# Patient Record
Sex: FEMALE | Race: BLACK OR AFRICAN AMERICAN | Employment: FULL TIME
[De-identification: names, ages, dates, MRNs, and addresses within clinical notes are randomized per-mention and may not be internally consistent; named-entity substitution may affect disease eponyms.]

---

## 2023-10-26 ENCOUNTER — NURSE TRIAGE (OUTPATIENT)
Dept: OTHER | Facility: CLINIC | Age: 18
End: 2023-10-26

## 2023-10-26 NOTE — TELEPHONE ENCOUNTER
[facility]    Received call from [facility]; Patient with [employer] Complaint requesting to establish care with [facility]. Subjective: Caller states \"difficulty breathing with heart palpitations\"     Current Symptoms:   -concern over hyperthyroid  -night sweats  -anxiety  -difficulty sleeping, falling asleep and staying asleep  -heart racing and pounding at times  -admits to suicidal thoughts, feeling like it would be better if she wouldn't  -admits to having a psychiatrist    Onset: 5 months ago; worsening    Pain Severity: denies pain    Temperature: denies fevers     What has been tried:   -prozac  -buspar    LMP:  irregular periods, occurring more frequentl    Recommended disposition: See PCP within 3 Days    Care advice provided, patient verbalizes understanding; denies any other questions or concerns; instructed to call back for any new or worsening symptoms. Patient/Caller agrees with recommended disposition; writer provided warm transfer to [facility] for appointment scheduling    Attention Provider: Thank you for allowing me to participate in the care of your patient. The patient was connected to triage in response to information provided to the [facility]. Please do not respond through this encounter as the response is not directed to a shared pool.     Reason for Disposition   History of hyperthyroidism or taking thyroid medication    Protocols used: Heart Rate and Heartbeat Questions-ADULT-

## 2024-01-18 LAB
ABO, EXTERNAL RESULT: NORMAL
C. TRACHOMATIS, EXTERNAL RESULT: NEGATIVE
HEP B, EXTERNAL RESULT: NEGATIVE
HIV, EXTERNAL RESULT: NEGATIVE
N. GONORRHOEAE, EXTERNAL RESULT: NEGATIVE
RPR, EXTERNAL RESULT: NORMAL
RUBELLA TITER, EXTERNAL RESULT: NORMAL

## 2024-08-05 LAB — GBS, EXTERNAL RESULT: POSITIVE

## 2024-08-22 ENCOUNTER — HOSPITAL ENCOUNTER (INPATIENT)
Facility: HOSPITAL | Age: 19
LOS: 2 days | Discharge: HOME | End: 2024-08-24
Attending: STUDENT IN AN ORGANIZED HEALTH CARE EDUCATION/TRAINING PROGRAM | Admitting: OBSTETRICS & GYNECOLOGY
Payer: MEDICAID

## 2024-08-22 ENCOUNTER — ANESTHESIA EVENT (OUTPATIENT)
Facility: HOSPITAL | Age: 19
End: 2024-08-22
Payer: MEDICAID

## 2024-08-22 ENCOUNTER — ANESTHESIA (OUTPATIENT)
Facility: HOSPITAL | Age: 19
End: 2024-08-22
Payer: MEDICAID

## 2024-08-22 PROBLEM — Z37.9 NORMAL LABOR: Status: ACTIVE | Noted: 2024-08-22

## 2024-08-22 LAB
ABO + RH BLD: NORMAL
BASOPHILS # BLD: 0.1 K/UL (ref 0–0.1)
BASOPHILS NFR BLD: 1 % (ref 0–1)
BLOOD GROUP ANTIBODIES SERPL: NORMAL
DIFFERENTIAL METHOD BLD: ABNORMAL
EOSINOPHIL # BLD: 0 K/UL (ref 0–0.4)
EOSINOPHIL NFR BLD: 0 % (ref 0–7)
ERYTHROCYTE [DISTWIDTH] IN BLOOD BY AUTOMATED COUNT: 13.7 % (ref 11.5–14.5)
HCT VFR BLD AUTO: 35 % (ref 35–47)
HGB BLD-MCNC: 11.5 G/DL (ref 11.5–16)
IMM GRANULOCYTES # BLD AUTO: 0.2 K/UL (ref 0–0.04)
IMM GRANULOCYTES NFR BLD AUTO: 2 % (ref 0–0.5)
LYMPHOCYTES # BLD: 2.3 K/UL (ref 0.8–3.5)
LYMPHOCYTES NFR BLD: 22 % (ref 12–49)
MCH RBC QN AUTO: 25.8 PG (ref 26–34)
MCHC RBC AUTO-ENTMCNC: 32.9 G/DL (ref 30–36.5)
MCV RBC AUTO: 78.7 FL (ref 80–99)
MONOCYTES # BLD: 1 K/UL (ref 0–1)
MONOCYTES NFR BLD: 9 % (ref 5–13)
NEUTS SEG # BLD: 7.2 K/UL (ref 1.8–8)
NEUTS SEG NFR BLD: 66 % (ref 32–75)
NRBC # BLD: 0 K/UL (ref 0–0.01)
NRBC BLD-RTO: 0 PER 100 WBC
PLATELET # BLD AUTO: 198 K/UL (ref 150–400)
PMV BLD AUTO: 12.4 FL (ref 8.9–12.9)
RBC # BLD AUTO: 4.45 M/UL (ref 3.8–5.2)
SPECIMEN EXP DATE BLD: NORMAL
WBC # BLD AUTO: 10.7 K/UL (ref 3.6–11)

## 2024-08-22 PROCEDURE — 51702 INSERT TEMP BLADDER CATH: CPT

## 2024-08-22 PROCEDURE — 86901 BLOOD TYPING SEROLOGIC RH(D): CPT

## 2024-08-22 PROCEDURE — 86900 BLOOD TYPING SEROLOGIC ABO: CPT

## 2024-08-22 PROCEDURE — 85025 COMPLETE CBC W/AUTO DIFF WBC: CPT

## 2024-08-22 PROCEDURE — 36415 COLL VENOUS BLD VENIPUNCTURE: CPT

## 2024-08-22 PROCEDURE — 2500000003 HC RX 250 WO HCPCS: Performed by: NURSE ANESTHETIST, CERTIFIED REGISTERED

## 2024-08-22 PROCEDURE — 6360000002 HC RX W HCPCS: Performed by: NURSE ANESTHETIST, CERTIFIED REGISTERED

## 2024-08-22 PROCEDURE — 1120000000 HC RM PRIVATE OB

## 2024-08-22 PROCEDURE — 86850 RBC ANTIBODY SCREEN: CPT

## 2024-08-22 PROCEDURE — 6370000000 HC RX 637 (ALT 250 FOR IP): Performed by: STUDENT IN AN ORGANIZED HEALTH CARE EDUCATION/TRAINING PROGRAM

## 2024-08-22 PROCEDURE — 7210000100 HC LABOR FEE PER 1 HR: Performed by: STUDENT IN AN ORGANIZED HEALTH CARE EDUCATION/TRAINING PROGRAM

## 2024-08-22 PROCEDURE — 6360000002 HC RX W HCPCS: Performed by: ADVANCED PRACTICE MIDWIFE

## 2024-08-22 PROCEDURE — 2580000003 HC RX 258: Performed by: ADVANCED PRACTICE MIDWIFE

## 2024-08-22 PROCEDURE — 3700000025 EPIDURAL BLOCK: Performed by: ANESTHESIOLOGY

## 2024-08-22 PROCEDURE — 3700000156 HC EPIDURAL ANESTHESIA: Performed by: NURSE ANESTHETIST, CERTIFIED REGISTERED

## 2024-08-22 PROCEDURE — 7220000101 HC DELIVERY VAGINAL/SINGLE: Performed by: STUDENT IN AN ORGANIZED HEALTH CARE EDUCATION/TRAINING PROGRAM

## 2024-08-22 RX ORDER — MISOPROSTOL 200 UG/1
400 TABLET ORAL PRN
Status: DISCONTINUED | OUTPATIENT
Start: 2024-08-22 | End: 2024-08-24 | Stop reason: HOSPADM

## 2024-08-22 RX ORDER — ONDANSETRON 2 MG/ML
4 INJECTION INTRAMUSCULAR; INTRAVENOUS EVERY 6 HOURS PRN
Status: DISCONTINUED | OUTPATIENT
Start: 2024-08-22 | End: 2024-08-22 | Stop reason: SDUPTHER

## 2024-08-22 RX ORDER — NALOXONE HYDROCHLORIDE 0.4 MG/ML
INJECTION, SOLUTION INTRAMUSCULAR; INTRAVENOUS; SUBCUTANEOUS PRN
Status: DISCONTINUED | OUTPATIENT
Start: 2024-08-22 | End: 2024-08-23

## 2024-08-22 RX ORDER — ONDANSETRON 4 MG/1
4 TABLET, ORALLY DISINTEGRATING ORAL EVERY 6 HOURS PRN
Status: DISCONTINUED | OUTPATIENT
Start: 2024-08-22 | End: 2024-08-22 | Stop reason: SDUPTHER

## 2024-08-22 RX ORDER — LANOLIN/MINERAL OIL
LOTION (ML) TOPICAL PRN
Status: DISCONTINUED | OUTPATIENT
Start: 2024-08-22 | End: 2024-08-24 | Stop reason: HOSPADM

## 2024-08-22 RX ORDER — SODIUM CHLORIDE 0.9 % (FLUSH) 0.9 %
5-40 SYRINGE (ML) INJECTION EVERY 12 HOURS SCHEDULED
Status: DISCONTINUED | OUTPATIENT
Start: 2024-08-22 | End: 2024-08-23

## 2024-08-22 RX ORDER — SODIUM CHLORIDE 9 MG/ML
25 INJECTION, SOLUTION INTRAVENOUS PRN
Status: DISCONTINUED | OUTPATIENT
Start: 2024-08-22 | End: 2024-08-22 | Stop reason: SDUPTHER

## 2024-08-22 RX ORDER — HYDROMORPHONE HYDROCHLORIDE 1 MG/ML
1 INJECTION, SOLUTION INTRAMUSCULAR; INTRAVENOUS; SUBCUTANEOUS
Status: DISCONTINUED | OUTPATIENT
Start: 2024-08-22 | End: 2024-08-24 | Stop reason: HOSPADM

## 2024-08-22 RX ORDER — DOCUSATE SODIUM 100 MG/1
100 CAPSULE, LIQUID FILLED ORAL 2 TIMES DAILY
Status: DISCONTINUED | OUTPATIENT
Start: 2024-08-22 | End: 2024-08-24 | Stop reason: HOSPADM

## 2024-08-22 RX ORDER — EPHEDRINE SULFATE/0.9% NACL/PF 25 MG/5 ML
10 SYRINGE (ML) INTRAVENOUS ONCE
Status: DISCONTINUED | OUTPATIENT
Start: 2024-08-22 | End: 2024-08-23

## 2024-08-22 RX ORDER — FENTANYL/BUPIVACAINE/NS/PF 2-1250MCG
10 PLASTIC BAG, INJECTION (ML) INJECTION CONTINUOUS
Status: DISCONTINUED | OUTPATIENT
Start: 2024-08-22 | End: 2024-08-23

## 2024-08-22 RX ORDER — CARBOPROST TROMETHAMINE 250 UG/ML
250 INJECTION, SOLUTION INTRAMUSCULAR PRN
Status: DISCONTINUED | OUTPATIENT
Start: 2024-08-22 | End: 2024-08-24 | Stop reason: HOSPADM

## 2024-08-22 RX ORDER — ACETAMINOPHEN 325 MG/1
650 TABLET ORAL EVERY 4 HOURS PRN
Status: DISCONTINUED | OUTPATIENT
Start: 2024-08-22 | End: 2024-08-24 | Stop reason: HOSPADM

## 2024-08-22 RX ORDER — TERBUTALINE SULFATE 1 MG/ML
0.25 INJECTION, SOLUTION SUBCUTANEOUS
Status: ACTIVE | OUTPATIENT
Start: 2024-08-22 | End: 2024-08-23

## 2024-08-22 RX ORDER — OXYCODONE HYDROCHLORIDE 5 MG/1
5 TABLET ORAL EVERY 4 HOURS PRN
Status: DISCONTINUED | OUTPATIENT
Start: 2024-08-22 | End: 2024-08-24 | Stop reason: HOSPADM

## 2024-08-22 RX ORDER — BUPIVACAINE HYDROCHLORIDE 2.5 MG/ML
INJECTION, SOLUTION EPIDURAL; INFILTRATION; INTRACAUDAL PRN
Status: DISCONTINUED | OUTPATIENT
Start: 2024-08-22 | End: 2024-08-22 | Stop reason: SDUPTHER

## 2024-08-22 RX ORDER — METHYLERGONOVINE MALEATE 0.2 MG/ML
200 INJECTION INTRAVENOUS PRN
Status: DISCONTINUED | OUTPATIENT
Start: 2024-08-22 | End: 2024-08-24 | Stop reason: HOSPADM

## 2024-08-22 RX ORDER — LIDOCAINE HYDROCHLORIDE 10 MG/ML
INJECTION, SOLUTION INFILTRATION; PERINEURAL PRN
Status: DISCONTINUED | OUTPATIENT
Start: 2024-08-22 | End: 2024-08-22 | Stop reason: SDUPTHER

## 2024-08-22 RX ORDER — ACETAMINOPHEN 500 MG
1000 TABLET ORAL EVERY 8 HOURS SCHEDULED
Status: DISCONTINUED | OUTPATIENT
Start: 2024-08-22 | End: 2024-08-24 | Stop reason: HOSPADM

## 2024-08-22 RX ORDER — SODIUM CHLORIDE, SODIUM LACTATE, POTASSIUM CHLORIDE, AND CALCIUM CHLORIDE .6; .31; .03; .02 G/100ML; G/100ML; G/100ML; G/100ML
500 INJECTION, SOLUTION INTRAVENOUS PRN
Status: DISCONTINUED | OUTPATIENT
Start: 2024-08-22 | End: 2024-08-24 | Stop reason: HOSPADM

## 2024-08-22 RX ORDER — IBUPROFEN 800 MG/1
800 TABLET, FILM COATED ORAL EVERY 8 HOURS SCHEDULED
Status: DISCONTINUED | OUTPATIENT
Start: 2024-08-22 | End: 2024-08-24 | Stop reason: HOSPADM

## 2024-08-22 RX ORDER — SODIUM CHLORIDE, SODIUM LACTATE, POTASSIUM CHLORIDE, CALCIUM CHLORIDE 600; 310; 30; 20 MG/100ML; MG/100ML; MG/100ML; MG/100ML
INJECTION, SOLUTION INTRAVENOUS CONTINUOUS
Status: DISCONTINUED | OUTPATIENT
Start: 2024-08-22 | End: 2024-08-23

## 2024-08-22 RX ORDER — ONDANSETRON 2 MG/ML
4 INJECTION INTRAMUSCULAR; INTRAVENOUS EVERY 6 HOURS PRN
Status: DISCONTINUED | OUTPATIENT
Start: 2024-08-22 | End: 2024-08-24 | Stop reason: HOSPADM

## 2024-08-22 RX ORDER — SODIUM CHLORIDE 0.9 % (FLUSH) 0.9 %
5-40 SYRINGE (ML) INJECTION EVERY 12 HOURS SCHEDULED
Status: DISCONTINUED | OUTPATIENT
Start: 2024-08-22 | End: 2024-08-24 | Stop reason: ALTCHOICE

## 2024-08-22 RX ORDER — SODIUM CHLORIDE 9 MG/ML
INJECTION, SOLUTION INTRAVENOUS PRN
Status: DISCONTINUED | OUTPATIENT
Start: 2024-08-22 | End: 2024-08-24 | Stop reason: HOSPADM

## 2024-08-22 RX ORDER — SODIUM CHLORIDE 0.9 % (FLUSH) 0.9 %
5-40 SYRINGE (ML) INJECTION PRN
Status: DISCONTINUED | OUTPATIENT
Start: 2024-08-22 | End: 2024-08-24 | Stop reason: HOSPADM

## 2024-08-22 RX ORDER — ONDANSETRON 4 MG/1
4 TABLET, ORALLY DISINTEGRATING ORAL EVERY 6 HOURS PRN
Status: DISCONTINUED | OUTPATIENT
Start: 2024-08-22 | End: 2024-08-24 | Stop reason: HOSPADM

## 2024-08-22 RX ORDER — TRANEXAMIC ACID 10 MG/ML
1000 INJECTION, SOLUTION INTRAVENOUS
Status: ACTIVE | OUTPATIENT
Start: 2024-08-22 | End: 2024-08-23

## 2024-08-22 RX ORDER — SODIUM CHLORIDE 0.9 % (FLUSH) 0.9 %
5-40 SYRINGE (ML) INJECTION PRN
Status: DISCONTINUED | OUTPATIENT
Start: 2024-08-22 | End: 2024-08-23

## 2024-08-22 RX ADMIN — DOCUSATE SODIUM 100 MG: 100 CAPSULE, LIQUID FILLED ORAL at 20:33

## 2024-08-22 RX ADMIN — LIDOCAINE HYDROCHLORIDE 3 ML: 10; .005 INJECTION, SOLUTION EPIDURAL; INFILTRATION; INTRACAUDAL; PERINEURAL at 06:49

## 2024-08-22 RX ADMIN — IBUPROFEN 800 MG: 800 TABLET, FILM COATED ORAL at 18:58

## 2024-08-22 RX ADMIN — Medication 10 ML/HR: at 06:56

## 2024-08-22 RX ADMIN — METHYLERGONOVINE MALEATE 200 MCG: 0.2 INJECTION, SOLUTION INTRAMUSCULAR; INTRAVENOUS at 12:04

## 2024-08-22 RX ADMIN — BUPIVACAINE HYDROCHLORIDE 7 ML: 2.5 INJECTION, SOLUTION EPIDURAL; INFILTRATION; INTRACAUDAL; PERINEURAL at 06:53

## 2024-08-22 RX ADMIN — PENICILLIN G POTASSIUM 5 MILLION UNITS: 5000000 INJECTION, POWDER, FOR SOLUTION INTRAMUSCULAR; INTRAVENOUS at 07:07

## 2024-08-22 RX ADMIN — LIDOCAINE HYDROCHLORIDE 3 ML: 10 INJECTION, SOLUTION INFILTRATION; PERINEURAL at 06:38

## 2024-08-22 RX ADMIN — OXYTOCIN 87.3 MILLI-UNITS/MIN: 10 INJECTION, SOLUTION INTRAMUSCULAR; INTRAVENOUS at 12:22

## 2024-08-22 RX ADMIN — ACETAMINOPHEN 1000 MG: 500 TABLET ORAL at 13:28

## 2024-08-22 RX ADMIN — Medication 166.7 ML: at 11:30

## 2024-08-22 RX ADMIN — SODIUM CHLORIDE, POTASSIUM CHLORIDE, SODIUM LACTATE AND CALCIUM CHLORIDE: 600; 310; 30; 20 INJECTION, SOLUTION INTRAVENOUS at 07:05

## 2024-08-22 ASSESSMENT — PAIN DESCRIPTION - LOCATION
LOCATION: HIP
LOCATION: VAGINA

## 2024-08-22 ASSESSMENT — PAIN DESCRIPTION - DESCRIPTORS
DESCRIPTORS: ACHING
DESCRIPTORS: ACHING

## 2024-08-22 ASSESSMENT — PAIN SCALES - GENERAL
PAINLEVEL_OUTOF10: 2
PAINLEVEL_OUTOF10: 4

## 2024-08-22 ASSESSMENT — PAIN DESCRIPTION - ORIENTATION
ORIENTATION: LOWER
ORIENTATION: RIGHT

## 2024-08-22 ASSESSMENT — PAIN - FUNCTIONAL ASSESSMENT: PAIN_FUNCTIONAL_ASSESSMENT: ACTIVITIES ARE NOT PREVENTED

## 2024-08-22 NOTE — PROGRESS NOTES
0605- Bedside shift change report given to Yary RN (oncoming nurse) by Segundo RN (offgoing nurse). Report included the following information Nurse Handoff Report, Adult Overview, Intake/Output, MAR, and Recent Results.     0628 - CRNA at bedside to place epidural     0637-time out     0649- test dose     0955- Patient actively pushing.  RN remains in continuous attendance at the bedside.  Assessment & evaluation of fetal heart rate ongoing via continuous EFM.     1112- MD Sher called to bedside for delivery    1119- MD Sher at bedside for delivery      1126- RN remained at bedside throughout pushing.  EFM continuously assessed.  Vaginal delivery of viable infant.     1130- MD Veloz notified pt uterus is deviated to the right. MD aware this RN will straight Cath pt     1159- Sienna RN called MD Sher to notify pt is having moderate bleeding with medium sized clots. Verbal order with readback to administer Methergine 200mcg IM and continue to asses    1355- TRANSFER - OUT REPORT:    Verbal report given to Ariel FALCON on Marita Platt  being transferred to MIU for routine progression of patient care       Report consisted of patient's Situation, Background, Assessment and   Recommendations(SBAR).     Information from the following report(s) Nurse Handoff Report, Adult Overview, Intake/Output, MAR, and Recent Results was reviewed with the receiving nurse.           Lines:   Peripheral IV 08/22/24 Proximal;Right Forearm (Active)        Opportunity for questions and clarification was provided.      Patient transported with:  Registered Nurse

## 2024-08-22 NOTE — ANESTHESIA POSTPROCEDURE EVALUATION
Department of Anesthesiology  Postprocedure Note    Patient: Marita Platt  MRN: [id number]  YOB: 2005  Date of evaluation: 8/22/2024    Procedure Summary       Date: 08/22/24 Room / Location:     Anesthesia Start: 0628 Anesthesia Stop: 1126    Procedure: Labor Analgesia Diagnosis:     Scheduled Providers:  Responsible Provider: Stefan Jackson MD    Anesthesia Type: epidural ASA Status: 2            Anesthesia Type: No value filed.    Chava Phase I:      Chava Phase II:      Anesthesia Post Evaluation    Patient location during evaluation: PACU  Patient participation: complete - patient participated  Level of consciousness: awake and alert  Pain score: 0  Airway patency: patent  Nausea & Vomiting: no vomiting and no nausea  Cardiovascular status: hemodynamically stable  Respiratory status: room air  Hydration status: stable  Multimodal analgesia pain management approach    No notable events documented.

## 2024-08-22 NOTE — DISCHARGE SUMMARY
Patient ID:  Marita Platt  [id number]  19 y.o.  2005    Admit Date: 2024    Discharge Date: 2024     Admitting Physician: Aniceto Goodman MD    Attending Physician: Mary Andrews MD    Admission Diagnoses: Normal labor [O80, Z37.9]    Procedures for this admission: Vaginal Delivery    Discharge Diagnoses: Same as above with TSVD producing a viable infant.  Information for the patient's :  Jaleesa Platt [[id number]]       Dr. Sher delivered  Intact perineum     Discharge Disposition:  Home    Discharge Condition:  Stable    Additional Diagnoses:  Uncomplicated prenatal course .     Maternal Labs: See prenatal records    History of Present Illness:   OB History          1    Para    1    Term    1            AB        Living    1         SAB        IAB        Ectopic        Molar        Multiple        Live Births    1              Admitted for active labor.     Hospital Course:   Patient was admitted as above and delivered via TSVD .  Please the chart for details.  The postpartum course was unremarkable.  She was deemed stable for discharge home on day 2.    Follow-up Care: Follow up in 4-6wks for PP visit         Signed:  Tawny Sher DO  2024  11:40 AM

## 2024-08-22 NOTE — DISCHARGE INSTRUCTIONS
medications.  The prescription pain medication is mixed with Tylenol, therefore, you should not take any extra Tylenol or acetaminophen until you have reduced your prescription pain medication.     Add heating pad or sitz baths as needed. Add hemorrhoid wipes or ointments if needed    Constipation  Constipation is normal after pregnancy and delivery, especially while taking prescription narcotic pain medication.  Over the counter remedies including ducosate (Colace), take 1-2 capsules 1-2 times daily for soft stool as needed.  You may also add/ try milk of magnesia or rectal remedies such as Dulcolax or Fleet’s enema.      Recovery: What to Expect at Home  Fatigue is expected.  Try to rest when you can and don't worry about doing housework or other tasks which can wait.  The soreness along your bottom will improve significantly over the first 2 weeks, but it may take 6 weeks before you are completely recovered.  Back pain or general body aches or muscle soreness are expected and should improve with acetominophen or ibuprofen.  Leg swelling due to pregnancy and/or IV fluids given in the hospital will take about two weeks to resolve.  Most women experience some form of the \"Baby Blues\" after having a baby.  Feeling emotional, tearful, frustrated, anxious, sad, and irritable some of the time is normal and go away after about 2 weeks.  Adequate rest and help from your family will help.  Take breaks from caring for the baby.  Call your doctor if your symptoms seem severe, last more than 2 weeks, or seem to be getting worse instead of better.  Get help immediately if you have thoughts of wanting to hurt yourself or others!      Call your doctor or seek immediate medical care if you have:  Heavy vaginal bleeding, soaking through one or more pads an hour for several hours.   Foul-smelling discharge from your vagina or incision.  Consistent nausea and vomiting and cannot keep fluids down.  Consistent pain that does not get  better after you take pain medicine.  Sudden chest pain and shortness of breath  Signs of a blood clot: pain/ swelling/ increasing redness in your lower extremeties  Signs of infection: increased pain in your abdomen or vaginal area; red streaks, warmth, or tenderness of your breasts; fever of 100.5 F or greater

## 2024-08-22 NOTE — ANESTHESIA PRE PROCEDURE
Answered      Vital Signs (Current):   Vitals:    08/22/24 0507   BP: 119/77   Pulse: 92   Resp: 16   Temp: 36.9 °C (98.5 °F)   TempSrc: Oral   SpO2: 98%                                              BP Readings from Last 3 Encounters:   08/22/24 119/77   06/14/23 126/72       NPO Status:                                                                                 BMI:   Wt Readings from Last 3 Encounters:   06/14/23 60.3 kg (133 lb)     There is no height or weight on file to calculate BMI.    CBC:   Lab Results   Component Value Date/Time    WBC 10.7 08/22/2024 05:24 AM    RBC 4.45 08/22/2024 05:24 AM    HGB 11.5 08/22/2024 05:24 AM    HCT 35.0 08/22/2024 05:24 AM    MCV 78.7 08/22/2024 05:24 AM    RDW 13.7 08/22/2024 05:24 AM     08/22/2024 05:24 AM       CMP: No results found for: \"NA\", \"K\", \"CL\", \"CO2\", \"BUN\", \"CREATININE\", \"GFRAA\", \"AGRATIO\", \"LABGLOM\", \"GLUCOSE\", \"GLU\", \"CALCIUM\", \"BILITOT\", \"ALKPHOS\", \"AST\", \"ALT\"    POC Tests: No results for input(s): \"POCGLU\", \"POCNA\", \"POCK\", \"POCCL\", \"POCBUN\", \"POCHEMO\", \"POCHCT\" in the last 72 hours.    Coags: No results found for: \"PROTIME\", \"INR\", \"APTT\"    HCG (If Applicable): No results found for: \"PREGTESTUR\", \"PREGSERUM\", \"HCG\", \"HCGQUANT\"     ABGs: No results found for: \"PHART\", \"PO2ART\", \"OLX9WKH\", \"GJB6AMD\", \"BEART\", \"T4NFXTCN\"     Type & Screen (If Applicable):  No results found for: \"LABABO\"    Drug/Infectious Status (If Applicable):  No results found for: \"HIV\", \"HEPCAB\"    COVID-19 Screening (If Applicable): No results found for: \"COVID19\"        Anesthesia Evaluation  Patient summary reviewed  Airway: Mallampati: I  TM distance: >3 FB   Neck ROM: full  Mouth opening: > = 3 FB   Dental: normal exam         Pulmonary:Negative Pulmonary ROS                              Cardiovascular:Negative CV ROS                      Neuro/Psych:   Negative Neuro/Psych ROS              GI/Hepatic/Renal: Neg GI/Hepatic/Renal ROS            Endo/Other: Negative

## 2024-08-22 NOTE — PROGRESS NOTES
0500: Pt came in c/o SROM at 0000 and CTX starting at 10 pm last night. Pt denies HA, blurred vision, NV, epigastric pain. Pt endorses fetal movement.    0505: Nitrazine positive.    0508: CNM at bedside assessing pt.    0514: SVE done at this time (2/50/-3). Pt to be admitted to unit.    0525: NST reactive. Verified by GEGE Santiago. CNM okay with pt coming off continuous EFM.    0530: Fluid bolus started for epidural.    0605: Bedside and Verbal shift change report given to GEGE Pringle RN (oncoming nurse) by GEGE Raymond RN (offgoing nurse). Report included the following information Nurse Handoff Report, Index, MAR, and Recent Results.

## 2024-08-22 NOTE — PROGRESS NOTES
Labor Progress Note  Patient seen, fetal heart rate and contraction pattern evaluated at 0815. Comfortable now with epidural    Physical Exam:  Vitals:   Vitals:    08/22/24 0743 08/22/24 0748 08/22/24 0753 08/22/24 0758   BP:    112/64   Pulse: 81 79 83 85   Resp:    18   Temp:       TempSrc:       SpO2: 100% 100% 100% 100%         Cervical Exam  was  examined   6 cm dilated    90% effaced    0 station    Presenting Part: cephalic    Uterine Activity:: Frequency: Every 1-2 minutes  Fetal Heart Rate: Reactive  Baseline: 135 per minute  Variability: moderate  Accelerations: yes  Decelerations: none  Pitocin: N/A    Assessment/Plan:  Ms. Platt is admitted with pregnancy at 38w3d in active labor    GBS pos on PCN   Labor progressing normally    Tawny Sher DO  8/22/2024  8:05 AM

## 2024-08-22 NOTE — L&D DELIVERY NOTE
Delivery Note:     Patient reached FD and pushed with good effort to deliver the fetal head in ZAHRA position.  No nuchal cord found.  The anterior shoulder, followed by the posterior shoulder and the rest of the body then delivered easily.  This was a boy with Apgars of 9 and 9 at 1 and 5 minutes respectively, weight pending  The infant was vigorous at birth and placed on mom's abdomen.  The cord was then double clamped and cut by the FOB after pulsations ceased  Cord blood was not collected  The placenta followed spontaneously, intact, with 3VC.  Pitocin was added to the IVF and the fundus was firm to palpation.  The vagina, cervix and perineum were examined and no lacerations were found.   mL.  No complications.  Mom and baby doing well.       Tawny Sher DO   [facility]       Jaleesa Platt [[id number]]      Labor Events     Labor: No   Steroids: None  Cervical Ripening Date/Time:      Antibiotics Received during Labor: Yes  Rupture Date/Time:  24 00:00:00   Rupture Type: SROM  Fluid Color: Clear  Augmentation: None  Labor Complications: None       Anesthesia    Method: Epidural       Labor Event Times      Labor onset date/time:  24 22:00:00 EDT     Dilation complete date/time:        Start pushing date/time:  2024 09:55:00   Decision date/time (emergent ):            Delivery Details      Delivery Date: 24 Delivery Time: 11:26:00   Delivery Type: Vaginal, Spontaneous              [place] Presentation    Presentation: Vertex  _: Occiput  _: Anterior       Shoulder Dystocia    Shoulder Dystocia Present?: No       Assisted Delivery Details    Forceps Attempted?: No  Vacuum Extractor Attempted?: No                           Cord    Vessels: 3 Vessels  Complications: None  Delayed Cord Clamping?: Yes  Cord Clamped Date/Time: 2024 11:28:00  Cord Blood Disposition: Discard  Gases Sent?: No              Placenta    Date/Time:  2024 11:30:21  Removal: Expressed  Appearance: Intact  Disposition: Discarded       Lacerations    Episiotomy: None  Perineal Lacerations: None  Other Lacerations: no non-perineal laceration       Vaginal Counts    Initial Count Personnel: RADHA FALCON  Initial Count Verified By: MD DOUGLAS  Intial Sponge Count: Correct Intial Needles Count: Correct Intial Instruments Count: Correct   Final Sponges Count: Correct Final Needles  Count: Correct Final Instruments Count: Correct   Final Count Personnel: MD DOUGLAS  Final Count Verified By: RADHA FALCON       Blood Loss  Mother: Marita Platt #[id number]     Start of Mother's Information      Delivery Blood Loss  24 2200 - 24 1146      Quantitative Blood Loss (mL) Hospital Encounter 200 grams    Total  200 mL               End of Mother's Information  Mother: Marita Platt #[id number]                Delivery Providers    Delivering clinician: Tawny Sher,      Provider Role    Tawny Sher, DO Obstetrician    Isaías Pringle RN Primary Nurse    Ayala Jones RN Primary [place] Nurse     NICU Nurse     Neonatologist     Anesthesiologist     Nurse Anesthetist     Nurse Practitioner     Midwife     Nursery Nurse     Respiratory Therapist     Scrub Tech     Assistant Surgeon    Abby Suárez, RN Nursery Nurse              [place] Assessment    Living Status: Living        Skin Color:   Heart Rate:   Reflex Irritability:   Muscle Tone:   Respiratory Effort:   Total:            1 Minute:    1    2    2    2    2    9         5 Minute:    1    2    2    2    2    9                                        Apgars Assigned By: FRITZ JONES RN              Resuscitation    Method: Stimulation, Room Air             [place] Measurements

## 2024-08-22 NOTE — H&P
History & Physical    Name: Marita Paltt MRN: [id number]  SSN: [id number]    YOB: 2005  Age: 19 y.o.  Sex: female        Subjective:     Estimated Date of Delivery: 24  OB History          1    Para        Term                AB        Living             SAB        IAB        Ectopic        Molar        Multiple        Live Births                    Ms. Platt is admitted with pregnancy at 38w3d for labor and SROM. Reports water broke around 0000 and contractions started two hours prior. Denies bleeding. Prenatal course was normal. Please see prenatal records for details.    Past Medical History:   Diagnosis Date    Anemia     Anxiety     Depression      History reviewed. No pertinent surgical history.  Social History     Occupational History    Not on file   Tobacco Use    Smoking status: Never    Smokeless tobacco: Never   Vaping Use    Vaping status: Every Day    Substances: Nicotine   Substance and Sexual Activity    Alcohol use: Not Currently    Drug use: Not Currently    Sexual activity: Not on file     History reviewed. No pertinent family history.    No Known Allergies  Prior to Admission medications    Medication Sig Start Date End Date Taking? Authorizing Provider   busPIRone (BUSPAR) 10 MG tablet Take 1 tablet by mouth 3 times daily   Yes ProviderChano MD   FLUoxetine (PROZAC) 10 MG tablet Take 1 tablet by mouth daily  Patient not taking: Reported on 2024    Chano Whittaker MD   etonogestrel (NEXPLANON) 68 MG implant 68 mg by Subdermal route once 2017  Patient not taking: Reported on 2024    Chano Whittaker MD   ibuprofen (ADVIL;MOTRIN) 800 MG tablet Take 1 tablet by mouth every 8 hours as needed for Pain  Patient not taking: Reported on 2024   Berto Cote PA-C        Review of Systems:  pertinent items are noted in HPI    Objective:     Vitals:  BP: 119/77  Hr 92  Afebrile   100%    Physical Exam:  Patient with  distress  Heart: Regular rate and rhythm  Lung: no wheezes, no rales, no rhonchi and normal respiratory effort  Back costovertebral angle tenderness: absent  Abdomen: soft, nontender  Fundus: soft and non tender  Perineum: blood absent, amniotic fluid present  Cervical Exam: 2 cm dilated    50% effaced    -3 station    Presenting Part: cephalic  Cervical Position: mid position  Lower Extremities:  - Edema No  Membranes:  Spontaneous Rupture of Membranes; Amniotic Fluid: clear fluid  Fetal Heart Rate: Reactive  Contractions: every 2-5 min     Prenatal Labs:   GBS+    Assessment/Plan:   20yo G1 at 38wks 3 days  Category 1 strip     Plan:   Admit for labor.  Begin PCN for GBS prophylaxis  Plans epidural   Anticipate vaginal delivery     Signed By:  OMAR Sheldon - VALERY     August 22, 2024

## 2024-08-22 NOTE — ANESTHESIA PROCEDURE NOTES
Epidural Block    Patient location during procedure: OB  Start time: 8/22/2024 6:28 AM  End time: 8/22/2024 6:54 AM  Reason for block: labor epidural  Staffing  Performed: resident/CRNA   Anesthesiologist: Stefan Jackson MD  Resident/CRNA: Camila Post APRN - CRNA  Performed by: Camila Post APRN - CRNA  Authorized by: Camila Post APRN - CRNA    Epidural  Patient position: sitting  Prep: ChloraPrep  Patient monitoring: continuous pulse ox and frequent blood pressure checks  Approach: midline  Location: L4-5  Injection technique: SAGE saline  Provider prep: mask  Needle  Needle type: Tuohy   Needle gauge: 17 G  Needle length: 3.5 in  Needle insertion depth: 6 cm  Catheter type: end hole  Catheter size: 19 G  Catheter at skin depth: 11 cm  Test dose: negativeCatheter Secured: tegaderm and tape  Assessment  Hemodynamics: stable  Attempts: 1  Outcomes: patient tolerated procedure well  Additional Notes  Pt C/O pain 6/10 with contractions, epidural placed without difficulty.  Preanesthetic Checklist  Completed: patient identified, IV checked, site marked, risks and benefits discussed, surgical/procedural consents, equipment checked, pre-op evaluation, timeout performed, anesthesia consent given, oxygen available, monitors applied/VS acknowledged, fire risk safety assessment completed and verbalized and blood product R/B/A discussed and consented

## 2024-08-23 PROCEDURE — 1120000000 HC RM PRIVATE OB

## 2024-08-23 PROCEDURE — 6370000000 HC RX 637 (ALT 250 FOR IP): Performed by: STUDENT IN AN ORGANIZED HEALTH CARE EDUCATION/TRAINING PROGRAM

## 2024-08-23 PROCEDURE — 86780 TREPONEMA PALLIDUM: CPT

## 2024-08-23 PROCEDURE — 36415 COLL VENOUS BLD VENIPUNCTURE: CPT

## 2024-08-23 RX ADMIN — DOCUSATE SODIUM 100 MG: 100 CAPSULE, LIQUID FILLED ORAL at 20:20

## 2024-08-23 RX ADMIN — IBUPROFEN 800 MG: 800 TABLET, FILM COATED ORAL at 09:45

## 2024-08-23 RX ADMIN — ACETAMINOPHEN 1000 MG: 500 TABLET ORAL at 04:24

## 2024-08-23 RX ADMIN — ACETAMINOPHEN 1000 MG: 500 TABLET ORAL at 20:20

## 2024-08-23 RX ADMIN — IBUPROFEN 800 MG: 800 TABLET, FILM COATED ORAL at 16:34

## 2024-08-23 ASSESSMENT — PAIN DESCRIPTION - DESCRIPTORS: DESCRIPTORS: ACHING

## 2024-08-23 ASSESSMENT — PAIN DESCRIPTION - LOCATION: LOCATION: BACK

## 2024-08-23 ASSESSMENT — PAIN SCALES - GENERAL
PAINLEVEL_OUTOF10: 1
PAINLEVEL_OUTOF10: 0

## 2024-08-23 NOTE — PROGRESS NOTES
PostPartum Note    Marita Platt  [id number]  2005  19 y.o.    S:  Ms. Marita Platt is a 19 y.o.  PPD #1 s/p  @ 38w3d.  Doing well.  She had a baby boy.  Her lochia is like a period.  She describes her pain as mild and is well controlled with PO medications.   She is ambulating and voiding.  Tolerating PO intake.      O:   /65   Pulse 67   Temp 98.4 °F (36.9 °C)   Resp 16   SpO2 100%   Breastfeeding Unknown     Lab Results   Component Value Date/Time    WBC 10.7 2024 05:24 AM    HGB 11.5 2024 05:24 AM    HCT 35.0 2024 05:24 AM     2024 05:24 AM    MCV 78.7 2024 05:24 AM       Gen - No acute distress  Abdomen - Fundus firm, below the umbilicus   Ext - Warm, well perfused.  Nontender    A/P:  PPD #1 s/p  @ 38w3d doing well.    1.  Routine PP instructions/ care discussed  2.  Circumcision desired   3.  Discharge PP2   4.  F/U 4-6 weeks for PP check.      Mary Andrews MD  [facility]

## 2024-08-24 VITALS
OXYGEN SATURATION: 100 % | RESPIRATION RATE: 16 BRPM | DIASTOLIC BLOOD PRESSURE: 78 MMHG | SYSTOLIC BLOOD PRESSURE: 103 MMHG | TEMPERATURE: 98.1 F | HEART RATE: 85 BPM

## 2024-08-24 PROCEDURE — 6370000000 HC RX 637 (ALT 250 FOR IP): Performed by: STUDENT IN AN ORGANIZED HEALTH CARE EDUCATION/TRAINING PROGRAM

## 2024-08-24 RX ORDER — IBUPROFEN 800 MG/1
800 TABLET, FILM COATED ORAL EVERY 8 HOURS PRN
Qty: 40 TABLET | Refills: 1 | Status: SHIPPED | OUTPATIENT
Start: 2024-08-24

## 2024-08-24 RX ADMIN — ACETAMINOPHEN 1000 MG: 500 TABLET ORAL at 05:20

## 2024-08-24 RX ADMIN — IBUPROFEN 800 MG: 800 TABLET, FILM COATED ORAL at 00:20

## 2024-08-24 ASSESSMENT — PAIN DESCRIPTION - DESCRIPTORS
DESCRIPTORS: CRAMPING
DESCRIPTORS: CRAMPING

## 2024-08-24 ASSESSMENT — PAIN - FUNCTIONAL ASSESSMENT
PAIN_FUNCTIONAL_ASSESSMENT: ACTIVITIES ARE NOT PREVENTED
PAIN_FUNCTIONAL_ASSESSMENT: ACTIVITIES ARE NOT PREVENTED

## 2024-08-24 ASSESSMENT — PAIN DESCRIPTION - LOCATION
LOCATION: ABDOMEN
LOCATION: ABDOMEN

## 2024-08-24 ASSESSMENT — PAIN SCALES - GENERAL
PAINLEVEL_OUTOF10: 4
PAINLEVEL_OUTOF10: 4

## 2024-08-24 ASSESSMENT — PAIN DESCRIPTION - ORIENTATION
ORIENTATION: LOWER
ORIENTATION: LOWER

## 2024-08-24 NOTE — PROGRESS NOTES
Post-Partum Day Number 2 Progress Note    Patient doing well post-partum without significant complaints.  Voiding without difficulty, normal lochia.    Vitals:  Patient Vitals for the past 24 hrs:   BP Temp Temp src Pulse Resp SpO2   24 0653 103/78 98.1 °F (36.7 °C) Oral 85 16 100 %   24 0042 118/79 98 °F (36.7 °C) Oral 89 16 100 %   24 2020 115/74 98.6 °F (37 °C) Oral 91 16 100 %   24 1434 107/75 98.4 °F (36.9 °C) Oral 78 16 100 %     Temp (24hrs), Av.3 °F (36.8 °C), Min:98 °F (36.7 °C), Max:98.6 °F (37 °C)      Vital signs stable, afebrile.    Exam:  Patient without distress.               Abdomen soft, fundus firm, nontender               Lower extremities, KITTY nontender w/o edema    Labs: No results found for this or any previous visit (from the past 24 hour(s)).    Assessment and Plan:  PPD 2 s/p    VMI / Rh pos / Rubella immune / circ done this am  Routine postpartum/postop care.  Discharge today-instructions reviewed, follow up in office in 4 weeks.    Maria Luisa Ellis DO, FACOG  [facility]

## 2024-08-26 LAB — T PALLIDUM AB SER QL IA: NON REACTIVE

## 2025-02-24 ENCOUNTER — HOSPITAL ENCOUNTER (EMERGENCY)
Facility: HOSPITAL | Age: 20
Discharge: HOME | End: 2025-02-24
Payer: MEDICAID

## 2025-02-24 VITALS
SYSTOLIC BLOOD PRESSURE: 110 MMHG | HEART RATE: 100 BPM | OXYGEN SATURATION: 98 % | HEIGHT: 64 IN | TEMPERATURE: 97.9 F | DIASTOLIC BLOOD PRESSURE: 71 MMHG | BODY MASS INDEX: 22.2 KG/M2 | WEIGHT: 130 LBS | RESPIRATION RATE: 18 BRPM

## 2025-02-24 DIAGNOSIS — U07.1 COVID-19: Primary | ICD-10-CM

## 2025-02-24 LAB
FLUAV RNA SPEC QL NAA+PROBE: NOT DETECTED
FLUBV RNA SPEC QL NAA+PROBE: NOT DETECTED
S PYO DNA THROAT QL NAA+PROBE: NOT DETECTED
SARS-COV-2 RNA RESP QL NAA+PROBE: DETECTED

## 2025-02-24 PROCEDURE — 99283 EMERGENCY DEPT VISIT LOW MDM: CPT

## 2025-02-24 PROCEDURE — 87636 SARSCOV2 & INF A&B AMP PRB: CPT

## 2025-02-24 PROCEDURE — 87651 STREP A DNA AMP PROBE: CPT

## 2025-02-24 RX ORDER — IBUPROFEN 600 MG/1
600 TABLET, FILM COATED ORAL 3 TIMES DAILY PRN
Qty: 30 TABLET | Refills: 0 | Status: SHIPPED | OUTPATIENT
Start: 2025-02-24

## 2025-02-24 RX ORDER — ACETAMINOPHEN 500 MG
500 TABLET ORAL EVERY 6 HOURS PRN
Qty: 30 TABLET | Refills: 0 | Status: SHIPPED | OUTPATIENT
Start: 2025-02-24

## 2025-02-24 ASSESSMENT — PAIN DESCRIPTION - LOCATION: LOCATION: THROAT

## 2025-02-24 ASSESSMENT — PAIN - FUNCTIONAL ASSESSMENT: PAIN_FUNCTIONAL_ASSESSMENT: 0-10

## 2025-02-24 ASSESSMENT — PAIN SCALES - GENERAL: PAINLEVEL_OUTOF10: 4

## 2025-02-24 ASSESSMENT — LIFESTYLE VARIABLES
HOW MANY STANDARD DRINKS CONTAINING ALCOHOL DO YOU HAVE ON A TYPICAL DAY: PATIENT DOES NOT DRINK
HOW OFTEN DO YOU HAVE A DRINK CONTAINING ALCOHOL: NEVER

## 2025-02-25 NOTE — ED PROVIDER NOTES
[facility] EMERGENCY DEPT  EMERGENCY DEPARTMENT HISTORY AND PHYSICAL EXAM      Date: 2/24/2025  Patient Name: Marita Platt  MRN: [id number]  YOB: 2005  Date of evaluation: 2/24/2025  Provider: OMAR Flannery NP   Note Started: 9:09 PM EST 2/24/25    HISTORY OF PRESENT ILLNESS     Chief Complaint   Patient presents with    Cold Symptoms       History Provided By: Patient    HPI: Marita Platt is a 19 y.o. female past medical history reviewed as listed below presents for a 2 day history of the following symptoms: Congestion, Cough, and Sore Throat and sneezing.    The patient does not report any Fever, Myalgias, Headache, Loss of taste and/or smell, Diarrhea, Nausea, Vomiting, Abdominal Pain, Chest Pain, and Shortness of breath.    Airway obstructive such as stridor, difficulty in swallowing secretions, thickened voice change significantly muffled voice, and significant neck swelling is not present    Patient had exposure to individuals with similar symptoms.    PAST MEDICAL HISTORY   Past Medical History:  Past Medical History:   Diagnosis Date    Anemia     Anxiety     Depression        Past Surgical History:  No past surgical history on file.    Family History:  No family history on file.    Social History:  Social History     Tobacco Use    Smoking status: Never    Smokeless tobacco: Never   Vaping Use    Vaping status: Every Day    Substances: Nicotine   Substance Use Topics    Alcohol use: Not Currently    Drug use: Not Currently       Allergies:  No Known Allergies    PCP: No primary care provider on file.    Current Meds:   No current facility-administered medications for this encounter.     Current Outpatient Medications   Medication Sig Dispense Refill    ibuprofen (ADVIL;MOTRIN) 600 MG tablet Take 1 tablet by mouth 3 times daily as needed for Pain 30 tablet 0    acetaminophen (TYLENOL) 500 MG tablet Take 1 tablet by mouth every 6 hours as needed for Pain or Fever 30 tablet 0    
Detail Level: Detailed
General Sunscreen Counseling: I recommended a broad spectrum sunscreen with a SPF of 30 or higher.  I explained that SPF 30 sunscreens block approximately 97 percent of the sun's harmful rays.  Sunscreens should be applied at least 15 minutes prior to expected sun exposure and then every 2 hours after that as long as sun exposure continues. If swimming or exercising sunscreen should be reapplied every 45 minutes to an hour after getting wet or sweating.  One ounce, or the equivalent of a shot glass full of sunscreen, is adequate to protect the skin not covered by a bathing suit. I also recommended a lip balm with a sunscreen as well. Sun protective clothing can be used in lieu of sunscreen but must be worn the entire time you are exposed to the sun's rays.

## 2025-02-25 NOTE — DISCHARGE INSTRUCTIONS
You were seen in the ER for your COVID/Flu-like symptoms. You should act as though you are positive even if you have had negative swabs. Thankfully, your vital signs are reassuring, including your oxygen and your heart rate. You should take tylenol or ibuprofen for fever, aches and pains for the next few days. You can alternate these every 3 hours so that you always have something on board. For instance, you can take tylenol at 9am, ibuprofen at noon, tylenol again at 3pm and ibuprofen again at 6pm. Take in plenty of water to stay hydrated and follow up with your primary care doctor in the next few days. Return to the ER for any uncontrollable vomiting or diarrhea, difficulty breathing, or any other new or concerning symptoms.